# Patient Record
Sex: MALE | Race: WHITE | Employment: UNEMPLOYED
[De-identification: names, ages, dates, MRNs, and addresses within clinical notes are randomized per-mention and may not be internally consistent; named-entity substitution may affect disease eponyms.]

---

## 2024-04-21 ENCOUNTER — APPOINTMENT (OUTPATIENT)
Facility: HOSPITAL | Age: 29
End: 2024-04-21
Payer: COMMERCIAL

## 2024-04-21 ENCOUNTER — HOSPITAL ENCOUNTER (EMERGENCY)
Facility: HOSPITAL | Age: 29
Discharge: HOME | End: 2024-04-22
Payer: COMMERCIAL

## 2024-04-21 DIAGNOSIS — M25.511 RIGHT SHOULDER PAIN, UNSPECIFIED CHRONICITY: Primary | ICD-10-CM

## 2024-04-21 DIAGNOSIS — Z98.890 HISTORY OF SHOULDER SURGERY: ICD-10-CM

## 2024-04-21 PROCEDURE — 6360000002 HC RX W HCPCS

## 2024-04-21 PROCEDURE — 73030 X-RAY EXAM OF SHOULDER: CPT

## 2024-04-21 PROCEDURE — 99283 EMERGENCY DEPT VISIT LOW MDM: CPT

## 2024-04-21 PROCEDURE — 6370000000 HC RX 637 (ALT 250 FOR IP)

## 2024-04-21 RX ORDER — METHOCARBAMOL 750 MG/1
750 TABLET, FILM COATED ORAL ONCE
Status: COMPLETED | OUTPATIENT
Start: 2024-04-21 | End: 2024-04-21

## 2024-04-21 RX ORDER — KETOROLAC TROMETHAMINE 15 MG/ML
15 INJECTION, SOLUTION INTRAMUSCULAR; INTRAVENOUS ONCE
Status: DISCONTINUED | OUTPATIENT
Start: 2024-04-21 | End: 2024-04-21

## 2024-04-21 RX ORDER — ACETAMINOPHEN 500 MG
1000 TABLET ORAL
Status: COMPLETED | OUTPATIENT
Start: 2024-04-21 | End: 2024-04-21

## 2024-04-21 RX ORDER — IBUPROFEN 600 MG/1
600 TABLET ORAL
Status: COMPLETED | OUTPATIENT
Start: 2024-04-21 | End: 2024-04-21

## 2024-04-21 RX ADMIN — METHOCARBAMOL 750 MG: 750 TABLET ORAL at 22:25

## 2024-04-21 RX ADMIN — IBUPROFEN 600 MG: 600 TABLET, FILM COATED ORAL at 22:55

## 2024-04-21 RX ADMIN — ACETAMINOPHEN 1000 MG: 500 TABLET ORAL at 22:55

## 2024-04-21 ASSESSMENT — PAIN DESCRIPTION - ORIENTATION
ORIENTATION: RIGHT
ORIENTATION: RIGHT

## 2024-04-21 ASSESSMENT — PAIN SCALES - GENERAL
PAINLEVEL_OUTOF10: 8
PAINLEVEL_OUTOF10: 8

## 2024-04-21 ASSESSMENT — PAIN DESCRIPTION - DESCRIPTORS: DESCRIPTORS: NUMBNESS;TINGLING;PINS AND NEEDLES

## 2024-04-21 ASSESSMENT — PAIN DESCRIPTION - LOCATION
LOCATION: SHOULDER;ARM
LOCATION: SHOULDER;ARM;RIB CAGE

## 2024-04-22 VITALS
OXYGEN SATURATION: 97 % | DIASTOLIC BLOOD PRESSURE: 99 MMHG | SYSTOLIC BLOOD PRESSURE: 154 MMHG | BODY MASS INDEX: 36.45 KG/M2 | TEMPERATURE: 98.4 F | RESPIRATION RATE: 19 BRPM | HEART RATE: 100 BPM | HEIGHT: 78 IN | WEIGHT: 315 LBS

## 2024-04-22 RX ORDER — PREDNISONE 10 MG/1
TABLET ORAL
Qty: 42 TABLET | Refills: 0 | Status: SHIPPED | OUTPATIENT
Start: 2024-04-22

## 2024-04-22 RX ORDER — NAPROXEN 500 MG/1
500 TABLET ORAL 2 TIMES DAILY PRN
Qty: 30 TABLET | Refills: 0 | Status: SHIPPED | OUTPATIENT
Start: 2024-04-22 | End: 2024-04-22 | Stop reason: CLARIF

## 2024-04-22 ASSESSMENT — PAIN SCALES - GENERAL: PAINLEVEL_OUTOF10: 8

## 2024-04-22 NOTE — ED PROVIDER NOTES
[facility] EMERGENCY DEPT  EMERGENCY DEPARTMENT HISTORY AND PHYSICAL EXAM      Date: 4/21/2024  Patient Name: Gabriel Dexter  MRN: [id number]  YOB: 1995  Date of evaluation: 4/21/2024  Provider: Dony Swartz PA-C   Note Started: 10:07 PM EDT 4/21/24    HISTORY OF PRESENT ILLNESS     Chief Complaint   Patient presents with    Back Pain       History Provided By: Patient    HPI: Gabriel Dexter is a 28 y.o. male with past history of prior shoulder surgery to fix a torn labrum presents emergency department complaining of right-sided back and right shoulder pain.  Patient reports that he will occasionally feel a pop in the right side of his back/shoulder that causes pain and a brief period of loss of sensation and motor function.  Reports that this has recently happened 1 week ago that once again couple of days.  States that he had a history of a labral tear in the past that required surgery, states that he has been moving around for multiple different states last couple years reporting difficulty with follow-up.  Reports that there was at one point a  concern for a repeat labral tear but was unable to follow up.       PAST MEDICAL HISTORY   Past Medical History:  History reviewed. No pertinent past medical history.    Past Surgical History:  History reviewed. No pertinent surgical history.    Family History:  History reviewed. No pertinent family history.    Social History:       Allergies:  Allergies   Allergen Reactions    Keflex [Cephalexin] Rash       PCP: No primary care provider on file.    Current Meds:   No current facility-administered medications for this encounter.     Current Outpatient Medications   Medication Sig Dispense Refill    predniSONE (DELTASONE) 10 MG tablet Take 5tab(50mg)x3days, 4tab(40mg)x3days, 3tab(30mg)x3days, 2tab(20mg)x2days, 1tab(10mg)x2days. #42 42 tablet 0       Social Determinants of Health:   Social Determinants of Health     Tobacco Use: Not on file   Alcohol Use: Not on file

## 2024-04-22 NOTE — ED TRIAGE NOTES
Pt states that he felt a pop in his back on the right side, he states the last time it popped it made his right arm go numb, has had shoulder surgery on the right side in the past, pt able to move RUE with pain in his knuckles, able to wiggle fingers as well.  Left hand dominant